# Patient Record
Sex: FEMALE | Race: BLACK OR AFRICAN AMERICAN | Employment: UNEMPLOYED | URBAN - METROPOLITAN AREA
[De-identification: names, ages, dates, MRNs, and addresses within clinical notes are randomized per-mention and may not be internally consistent; named-entity substitution may affect disease eponyms.]

---

## 2022-04-11 ENCOUNTER — HOSPITAL ENCOUNTER (EMERGENCY)
Age: 22
Discharge: HOME | End: 2022-04-11
Payer: COMMERCIAL

## 2022-04-11 VITALS
DIASTOLIC BLOOD PRESSURE: 76 MMHG | WEIGHT: 133.38 LBS | BODY MASS INDEX: 25.18 KG/M2 | SYSTOLIC BLOOD PRESSURE: 120 MMHG | OXYGEN SATURATION: 99 % | TEMPERATURE: 99.4 F | HEART RATE: 90 BPM | RESPIRATION RATE: 20 BRPM | HEIGHT: 61 IN

## 2022-04-11 DIAGNOSIS — J02.9 ACUTE PHARYNGITIS, UNSPECIFIED ETIOLOGY: Primary | ICD-10-CM

## 2022-04-11 PROCEDURE — 6370000000 HC RX 637 (ALT 250 FOR IP): Performed by: PHYSICIAN ASSISTANT

## 2022-04-11 PROCEDURE — 99284 EMERGENCY DEPT VISIT MOD MDM: CPT

## 2022-04-11 RX ORDER — ACETAMINOPHEN 500 MG
1000 TABLET ORAL EVERY 8 HOURS PRN
Qty: 360 TABLET | Refills: 1 | Status: SHIPPED | OUTPATIENT
Start: 2022-04-11

## 2022-04-11 RX ORDER — AMOXICILLIN 500 MG/1
500 CAPSULE ORAL 2 TIMES DAILY
Qty: 20 CAPSULE | Refills: 0 | Status: SHIPPED | OUTPATIENT
Start: 2022-04-11 | End: 2022-04-21

## 2022-04-11 RX ORDER — ACETAMINOPHEN 500 MG
1000 TABLET ORAL ONCE
Status: COMPLETED | OUTPATIENT
Start: 2022-04-11 | End: 2022-04-11

## 2022-04-11 RX ORDER — AMOXICILLIN 250 MG/1
250 CAPSULE ORAL ONCE
Status: COMPLETED | OUTPATIENT
Start: 2022-04-11 | End: 2022-04-11

## 2022-04-11 RX ORDER — METHYLPREDNISOLONE 4 MG/1
TABLET ORAL
Qty: 1 KIT | Refills: 0 | Status: SHIPPED | OUTPATIENT
Start: 2022-04-11

## 2022-04-11 RX ADMIN — AMOXICILLIN 250 MG: 250 CAPSULE ORAL at 19:26

## 2022-04-11 RX ADMIN — ACETAMINOPHEN 1000 MG: 500 TABLET ORAL at 19:26

## 2022-04-11 ASSESSMENT — PAIN DESCRIPTION - DESCRIPTORS
DESCRIPTORS: SHARP
DESCRIPTORS: SHARP

## 2022-04-11 ASSESSMENT — ENCOUNTER SYMPTOMS
EYE REDNESS: 0
SORE THROAT: 1
TROUBLE SWALLOWING: 0
EYE PAIN: 0
RHINORRHEA: 0

## 2022-04-11 ASSESSMENT — PAIN SCALES - GENERAL
PAINLEVEL_OUTOF10: 5
PAINLEVEL_OUTOF10: 5

## 2022-04-11 ASSESSMENT — PAIN DESCRIPTION - FREQUENCY
FREQUENCY: INTERMITTENT
FREQUENCY: INTERMITTENT

## 2022-04-11 ASSESSMENT — PAIN DESCRIPTION - PAIN TYPE
TYPE: ACUTE PAIN
TYPE: ACUTE PAIN

## 2022-04-11 ASSESSMENT — PAIN DESCRIPTION - LOCATION
LOCATION: THROAT
LOCATION: THROAT

## 2022-04-11 NOTE — ED PROVIDER NOTES
[address] [facility]        Pt Name: Darcy Fowler  MRN: [id number]  [place] 2000  Date of evaluation: 4/11/2022  Provider: Juan Monte PA-C  PCP: João Shore  Note Started: 7:40 PM EDT      RONA. I have evaluated this patient. My supervising physician was available for consultation. Triage CHIEF COMPLAINT       Chief Complaint   Patient presents with    Pharyngitis         HISTORY OF PRESENT ILLNESS   (Location/Symptom, Timing/Onset, Context/Setting, Quality, Duration, Modifying Factors, Severity)  Note limiting factors. Chief Complaint: Pharyngitis    Darcy Fowler is a 25 y.o. female who presents to emergency department with complaint of sore throat that is been present for the past 4 days. Patient states that it has progressively worsened. She states that she has taken Tylenol and this helped break of fever that she did not have on Saturday night. Other than that she states that symptoms have persisted, and it is painful to swallow. She is able to swallow her secretions. She denies congestion, ear pain, chest pain, shortness of breath. Nursing Notes were all reviewed and agreed with or any disagreements were addressed in the HPI. REVIEW OF SYSTEMS    (2-9 systems for level 4, 10 or more for level 5)     Review of Systems   Constitutional: Positive for chills and fever. HENT: Positive for sore throat. Negative for congestion, ear pain, rhinorrhea and trouble swallowing. Eyes: Negative for pain and redness. PAST MEDICAL HISTORY   History reviewed. No pertinent past medical history. SURGICAL HISTORY   History reviewed. No pertinent surgical history. CURRENTMEDICATIONS       Previous Medications    No medications on file       ALLERGIES     Patient has no allergy information on record. FAMILYHISTORY     History reviewed. No pertinent family history.      SOCIAL HISTORY       Social History Socioeconomic History    Marital status: Single     Spouse name: None    Number of children: None    Years of education: None    Highest education level: None   Occupational History    None   Tobacco Use    Smoking status: Never Smoker    Smokeless tobacco: Never Used   Substance and Sexual Activity    Alcohol use: Not Currently    Drug use: Never    Sexual activity: None   Other Topics Concern    None   Social History Narrative    None     Social Determinants of Health     Financial Resource Strain:     Difficulty of Paying Living Expenses: Not on file   Food Insecurity:     Worried About Running Out of Food in the Last Year: Not on file    Mark of Food in the Last Year: Not on file   Transportation Needs:     Lack of Transportation (Medical): Not on file    Lack of Transportation (Non-Medical):  Not on file   Physical Activity:     Days of Exercise per Week: Not on file    Minutes of Exercise per Session: Not on file   Stress:     Feeling of Stress : Not on file   Social Connections:     Frequency of Communication with Friends and Family: Not on file    Frequency of Social Gatherings with Friends and Family: Not on file    Attends Congregation Services: Not on file    Active Member of [address] [place] [address] or Organizations: Not on file    Attends Club or Organization Meetings: Not on file    Marital Status: Not on file   Intimate Partner Violence:     Fear of Current or Ex-Partner: Not on file    Emotionally Abused: Not on file    Physically Abused: Not on file    Sexually Abused: Not on file   Housing Stability:     Unable to Pay for Housing in the Last Year: Not on file    Number of [place] in the Last Year: Not on file    Unstable Housing in the Last Year: Not on file       SCREENINGS             PHYSICAL EXAM    (up to 7 for level 4, 8 or more for level 5)     ED Triage Vitals [04/11/22 1852]   BP Temp Temp Source Heart Rate Resp SpO2 Height Weight - Scale   127/84 99.4 °F (37.4 °C) Oral (!) 114 20 100 % 5' 1\" (1.549 m) 133 lb 6.1 oz (60.5 kg)       Physical Exam  Constitutional:       General: She is not in acute distress. Appearance: Normal appearance. She is not ill-appearing, toxic-appearing or diaphoretic. HENT:      Head: Normocephalic and atraumatic. Right Ear: Tympanic membrane and external ear normal.      Left Ear: Tympanic membrane and external ear normal.      Nose: Nose normal.      Mouth/Throat:      Mouth: Mucous membranes are moist.      Pharynx: Uvula midline. Oropharyngeal exudate and posterior oropharyngeal erythema present. Comments: Patient with positive exudates on her right tonsil, uvula is normal and midline  Mild erythema  No tonsillar edema  Eyes:      General:         Right eye: No discharge. Left eye: No discharge. Extraocular Movements:      Right eye: Normal extraocular motion. Left eye: Normal extraocular motion. Conjunctiva/sclera: Conjunctivae normal.      Pupils: Pupils are equal, round, and reactive to light. Cardiovascular:      Rate and Rhythm: Normal rate and regular rhythm. Pulmonary:      Effort: Pulmonary effort is normal. No respiratory distress. Musculoskeletal:         General: Normal range of motion. Cervical back: Normal range of motion. Skin:     General: Skin is warm and dry. Neurological:      General: No focal deficit present. Mental Status: She is alert and oriented to person, place, and time. Psychiatric:         Mood and Affect: Mood normal.         Behavior: Behavior normal.         DIAGNOSTIC RESULTS   LABS:    Labs Reviewed - No data to display    When ordered, only abnormal lab results are displayed. All other labs were within normal range or not returned as of this dictation. EKG: When ordered, EKG's are interpreted by the Emergency Department Physician in the absence of a cardiologist.  Please see their note for interpretation of EKG.       RADIOLOGY:   Non-plain film images such as CT, Ultrasound and MRI are read by the radiologist. Plain radiographic images are visualized andpreliminarily interpreted by the  ED Provider with the below findings:        Interpretation [facility] Radiologist below, if available at the time of this note:    No orders to display     No results found. PROCEDURES   Unless otherwise noted below, none     Procedures    CRITICAL CARE TIME   N/A    CONSULTS:  None      EMERGENCY DEPARTMENT COURSE and DIFFERENTIAL DIAGNOSIS/MDM:   Vitals:    Vitals:    04/11/22 1852 04/11/22 1926   BP: 127/84 120/76   Pulse: (!) 114 90   Resp: 20 20   Temp: 99.4 °F (37.4 °C)    TempSrc: Oral    SpO2: 100% 99%   Weight: 133 lb 6.1 oz (60.5 kg)    Height: 5' 1\" (1.549 m)        Patient was given thefollowing medications:  Medications   amoxicillin (AMOXIL) capsule 250 mg (250 mg Oral Given 4/11/22 1926)   acetaminophen (TYLENOL) tablet 1,000 mg (1,000 mg Oral Given 4/11/22 1926)           This is a 72-year-old female who presents emergency department with complaint of sore throat that is been present for the past 4 days. Vitals upon arrival show that she is within normal limits. I did do a physical exam that shows positive exudate on her right tonsil, her uvula is midline. She did ask for a prescription for Tylenol, and we discussed giving a prescription for amoxicillin to treat her pharyngitis. I did discuss with patient following up with her primary care physician for further evaluation and she was agreeable to this plan. I encouraged her to increase her water intake after taking Tylenol and take her antibiotic in full for the 10 days. We discussed returning to the emergency department if any new worsening or more concerning symptoms present, and patient was agreeable to this plan. She was discharged in stable condition with 3 prescriptions.   At this time I have low suspicion for life-threatening disease including tonsillar abscess, complications of Streptococcus pharyngitis, other. FINAL IMPRESSION      1. Acute pharyngitis, unspecified etiology          DISPOSITION/PLAN   DISPOSITION Decision To Discharge 04/11/2022 07:19:51 PM      PATIENT REFERREDTO:  [facility]) Pre-Services  [phone]  Schedule an appointment as soon as possible for a visit in 2 days  for reevaluation    [facility] Emergency Department  [address] [address]  [phone]  Go to   As needed, If symptoms worsen      DISCHARGE MEDICATIONS:  New Prescriptions    ACETAMINOPHEN (TYLENOL) 500 MG TABLET    Take 2 tablets by mouth every 8 hours as needed for Pain    AMOXICILLIN (AMOXIL) 500 MG CAPSULE    Take 1 capsule by mouth 2 times daily for 10 days    METHYLPREDNISOLONE (MEDROL DOSEPACK) 4 MG TABLET    Take by mouth as directed.        DISCONTINUED MEDICATIONS:  Discontinued Medications    No medications on file              (Please note that portions ofthis note were completed with a voice recognition program.  Efforts were made to edit the dictations but occasionally words are mis-transcribed.)    Darline Saldaña PA-C (electronically signed)              Darline Saldaña PA-C  04/11/22 1944